# Patient Record
(demographics unavailable — no encounter records)

---

## 2024-10-07 NOTE — PHYSICAL EXAM
[TextEntry] : Constitutional: Well  appearing, not in acute distress Head: Normocephalic, no lesions Eyes: PERRLA, conjunctiva is NL, clear Ear: Ear canal is normal, tympanic membrane is intact Nose: Nasal turbinates are NL Throat: Clear, no exudates, no lesions Neck: Supple, no masses Chest: Lungs are clear, no rales, no rhonchi, no wheezing Heart: Regular rate, no murmurs Breast: b/l breast is symmetric, areola and nipple is NL, breast is multinodular, dense. Axillary LN is NL b/l Abdomen: Soft, no tenderness, no masses, bowel sounds are normal : No CVAT Extremities: FROM, no deformities, no edema Musculoskeletal: has no tenderness of the spine, ROM is NL Skin: No rashes, no lesions noted Neuro: AAO x 3, no focal neurological deficit. Psychiatric: oriented to person, place, and time and insight and judgment were intact.

## 2024-10-07 NOTE — HISTORY OF PRESENT ILLNESS
[FreeTextEntry1] : Annual visit. [de-identified] : Ms. FRANCHESKA CARROLL 46 year old female with a PMH HTN, hyperlipidemia, SADIA on a CPAP present to the office for a physical exam. Patient feels good, c/o joint pain  (L knee, L elbow, bilateral wrists, R pinky) on/off for past few months.  Following up with ENT, planning for tonsillectomy/adenoidectomy.  Hasn't had menses since 07/2024, appt with GYN on 10/16/2024.  Patient was seen by a cardiologist, had a ct scan of the coronary arteries on 07/17/24 - Calcium score 0 Seen by a pulmonologist did a sleep study dz with a sleep apnea

## 2024-10-07 NOTE — PLAN
[FreeTextEntry1] : Ms. FRANCHESKA CARROLL 46 year female with a PMH HTN, hyperlipidemia present to the office for a physical exam.  Well adult exam is performed. Result of the blood test discussed with the patient in detail from 09/28/24 Has mild Hyperlipidemia, LDL is 103, recommend low cholesterol diet ALT - 33(nl 0-32) CBC, CMP, TSH is NL HTN is well controlled, continue norvasc, metoprolol Do mammo, breast u/s F/u with a GYN F/u with GI(screening colonoscopy) For a joint pain do a blood test, f/u rheumatologist Anxiety - STEPH 7 score 5 - mild anxiety start zoloft 25mg daily Refused to get a flu vaccine  RTC to f/u in 3-4 mo.  Patient is verbalized understanding

## 2024-10-07 NOTE — PAST MEDICAL HISTORY
[Total Preg ___] : G[unfilled] [Live Births ___] : P[unfilled]  [Perimenopausal] : perimenopausal [Definite ___ (Date)] : the last menstrual period was [unfilled] [Normal Amount/Duration] : it was of a normal amount and duration [Amenorrhea] : amenorrhea

## 2024-10-07 NOTE — HEALTH RISK ASSESSMENT
[Good] : ~his/her~  mood as  good [Yes] : Yes [2 - 4 times a month (2 pts)] : 2-4 times a month (2 points) [Never (0 pts)] : Never (0 points) [No] : In the past 12 months have you used drugs other than those required for medical reasons? No [No falls in past year] : Patient reported no falls in the past year [0] : 2) Feeling down, depressed, or hopeless: Not at all (0) [PHQ-2 Negative - No further assessment needed] : PHQ-2 Negative - No further assessment needed [Current] : Current [0-4] : 0-4 [Patient reported mammogram was normal] : Patient reported mammogram was normal [Patient reported PAP Smear was normal] : Patient reported PAP Smear was normal [Learning/Retaining New Information] : difficulty learning/retaining new information [None] : None [With Family] : lives with family [# of Members in Household ___] :  household currently consist of [unfilled] member(s) [Employed] : employed [College] : College [] :  [# Of Children ___] : has [unfilled] children [Sexually Active] : sexually active [Feels Safe at Home] : Feels safe at home [Fully functional (bathing, dressing, toileting, transferring, walking, feeding)] : Fully functional (bathing, dressing, toileting, transferring, walking, feeding) [Fully functional (using the telephone, shopping, preparing meals, housekeeping, doing laundry, using] : Fully functional and needs no help or supervision to perform IADLs (using the telephone, shopping, preparing meals, housekeeping, doing laundry, using transportation, managing medications and managing finances) [Reports changes in vision] : Reports changes in vision [Smoke Detector] : smoke detector [Carbon Monoxide Detector] : carbon monoxide detector [Safety elements used in home] : safety elements used in home [Seat Belt] :  uses seat belt [Sunscreen] : uses sunscreen [Fair] : ~his/her~ current health as fair  [Intercurrent ED visits] : went to ED [5 or 6 (2 pts)] : 5 or 6 (2  points) [HIV Test offered] : HIV Test offered [Hepatitis C test offered] : Hepatitis C test offered [With Patient/Caregiver] : , with patient/caregiver [Name: ___] : Health Care Proxy's Name: [unfilled]  [Aggressive treatment] : aggressive treatment [I will adhere to the patient's wishes.] : I will adhere to the patient's wishes. [Time Spent: ___ minutes] : Time Spent: [unfilled] minutes [Reviewed no changes] : Reviewed, no changes [Relationship: ___] : Relationship: [unfilled] [NO] : No [de-identified] : May for palpitations [de-identified] : cardiologist, gynecologist, pulmonologist [Audit-CScore] : 4 [de-identified] : no [de-identified] : regular [[id number]] : 0 [Change in mental status noted] : No change in mental status noted [Language] : denies difficulty with language [Behavior] : denies difficulty with behavior [Handling Complex Tasks] : denies difficulty handling complex tasks [High Risk Behavior] : no high risk behavior [Reports changes in hearing] : Reports no changes in hearing [Reports changes in dental health] : Reports no changes in dental health [Travel to Developing Areas] : does not  travel to developing areas [TB Exposure] : is not being exposed to tuberculosis [MammogramDate] : 2023 [PapSmearDate] : 10/2023 [ColonoscopyDate] : 12/08/2019 [ColonoscopyComments] : IH, mild diverticulosis, f/u in 5 years [de-identified] :  and son [FreeTextEntry2] : works as a  at school [de-identified] : needs glasses for a far distance [AdvancecareDate] : 10/7/2024 [FreeTextEntry4] : Sedrick Toussaint  [phone]

## 2024-10-07 NOTE — COUNSELING
[Fall prevention counseling provided] : Fall prevention counseling provided [Adequate lighting] : Adequate lighting [No throw rugs] : No throw rugs [Use proper foot wear] : Use proper foot wear [Yes] : Risk of tobacco use and health benefits of smoking cessation discussed: Yes [Encouraged to pick a quit date and identify support needed to quit] : Encouraged to pick a quit date and identify support needed to quit [AUDIT-C Screening administered and reviewed] : AUDIT-C Screening administered and reviewed

## 2024-10-07 NOTE — REVIEW OF SYSTEMS
[TextEntry] : Constitutional: Denies fever, fatigue, recent changes in the weight Head: Denies headache, dizziness Eyes: Denies diplopia, tearing or pain Ears: Denies earache, tinnitus, hearing loss Nose: c/o  nasal congestion, Denies epistaxis Throat: Denies throat pain Neck: Denies stiffness, muscle tenderness Chest: Denies cough, SOB, wheezing, chest congestion CV: Denies chest pain, palpitation GI: Denies abdominal pain, constipation, heartburn Genitourinary: Denies dysuria, urinary urgency Musculoskeletal: c/o joint pain on/off Neuro: Denies changes in mental status Psychiatric: c/o anxiety on/off, Denies depressive symptoms, change in sleep habits, changes in thought content

## 2024-10-22 NOTE — PROCEDURE
[FreeTextEntry1] : Maskfitting Patient is currently using a ResMed F30i mask, size Wide.  She did not need to wear a size wide but she said she was wearing it because she thought it was medium.  She is pulling her mask off while asleep and dislikes the tubing wrapping up into her hair. Patient was fit with a ResMed N30i nasal mask, size small with a chin strap.  FRANCHESKA CARROLL was comfortable with the fit. Patient will try this mask and follow up with us within 2 weeks.

## 2024-12-05 NOTE — HISTORY OF PRESENT ILLNESS
[FreeTextEntry1] : Family h/o colon cancer last had procedure 5 years ago.  also had EGD 3x in past last one 2019 had GERD  recently started on BP meds and is controlled- sees cardiologist regularly.  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

## 2024-12-05 NOTE — PHYSICAL EXAM
[Alert] : alert [Normal Voice/Communication] : normal voice/communication [Healthy Appearing] : healthy appearing [No Acute Distress] : no acute distress [Sclera] : the sclera and conjunctiva were normal [Hearing Threshold Finger Rub Not [place]] : hearing was normal [Normal Lips/Gums] : the lips and gums were normal [Oropharynx] : the oropharynx was normal [Normal Appearance] : the appearance of the neck was normal [No Neck Mass] : no neck mass was observed [No Respiratory Distress] : no respiratory distress [No Acc Muscle Use] : no accessory muscle use [Respiration, Rhythm And Depth] : normal respiratory rhythm and effort [Auscultation Breath Sounds / Voice Sounds] : lungs were clear to auscultation bilaterally [Heart Rate And Rhythm] : heart rate was normal and rhythm regular [Normal S1, S2] : normal S1 and S2 [Murmurs] : no murmurs [Bowel Sounds] : normal bowel sounds [Abdomen Tenderness] : non-tender [No Masses] : no abdominal mass palpated [Abdomen Soft] : soft [] : no hepatosplenomegaly [Oriented To Time, Place, And Person] : oriented to person, place, and time

## 2024-12-26 NOTE — ASSESSMENT
[FreeTextEntry1] : 47 year old female presents with polyarticular joint pains, which appear to be multifactorial: 1)  Pain in hands, shelly B/L 5th DIP's:  most c/w OA   - Ordered x-rays of hands.   - Recommended hand exercises   - Rx naproxen 500mg BID prn.  Pt can also try Tylenol prn   - heating pads or ice packs   - OTC topical analgesics prn - suggested Voltaren gel 2)  B/L elbow pain:  most c/w lateral epicondylitis   - Minimize activities placing stress on the elbow   - Naproxen prn as above   - ice packs   - tennis elbow band   - Elbow exercises 3)  B/L "hip" pain:  most c/w trochanteric bursitis    - Recommended exercises   - Naproxen as above   - Ice packs prn   - If no improvement by next visit, pt to consider corticosteroid injections.

## 2024-12-26 NOTE — HISTORY OF PRESENT ILLNESS
[FreeTextEntry1] : 47 year old female with PMHx as listed below reports that she has been experiencing joint pains, including her B/L 5th DIP's, elbows, and hips, for the past year. B/L hip pain:  occurs primarily when lying on her sides.  Described as dull, 6-7 out of 10.  No swelling or erythema B/L 5th DIP's and B/L elbows:   The pain occurs primarily w/ activity.  She describes the pain as sharp, 6-7 out of 10.  She denies any joint swelling or erythema.  (+)AM stiffness, usually lasting for several minutes.  She gets some relief from ibuprofen or turmeric.  No other known alleviating factors. No F/C, no unintentional weight loss, no night sweats, no oral ulcers, no rashes, no alopecia, no photosensitivity, no dry eyes/dry mouth, no Raynaud symptoms, no focal weakness, no dysphagia  [Anorexia] : no anorexia [Weight Loss] : no weight loss [Malaise] : no malaise [Fever] : no fever [Chills] : no chills [Fatigue] : no fatigue [Malar Facial Rash] : no malar facial rash [Skin Lesions] : no lesions [Skin Nodules] : no skin nodules [Oral Ulcers] : no oral ulcers [Cough] : no cough [Dry Mouth] : no dry mouth [Dysphonia] : no dysphonia [Dysphagia] : no dysphagia [Shortness of Breath] : no shortness of breath [Chest Pain] : no chest pain

## 2024-12-26 NOTE — PHYSICAL EXAM
[General Appearance - Alert] : alert [General Appearance - In No Acute Distress] : in no acute distress [Sclera] : the sclera and conjunctiva were normal [Outer Ear] : the ears and nose were normal in appearance [Oropharynx] : the oropharynx was normal [Neck Appearance] : the appearance of the neck was normal [Neck Cervical Mass (___cm)] : no neck mass was observed [Jugular Venous Distention Increased] : there was no jugular-venous distention [Thyroid Diffuse Enlargement] : the thyroid was not enlarged [Thyroid Nodule] : there were no palpable thyroid nodules [Auscultation Breath Sounds / Voice Sounds] : lungs were clear to auscultation bilaterally [Heart Rate And Rhythm] : heart rate was normal and rhythm regular [Heart Sounds] : normal S1 and S2 [Heart Sounds Gallop] : no gallops [Murmurs] : no murmurs [Heart Sounds Pericardial Friction Rub] : no pericardial rub [Edema] : there was no peripheral edema [Bowel Sounds] : normal bowel sounds [Abdomen Soft] : soft [Abdomen Tenderness] : non-tender [Abdomen Mass (___ Cm)] : no abdominal mass palpated [Cervical Lymph Nodes Enlarged Posterior Bilaterally] : posterior cervical [Cervical Lymph Nodes Enlarged Anterior Bilaterally] : anterior cervical [Skin Color & Pigmentation] : normal skin color and pigmentation [Supraclavicular Lymph Nodes Enlarged Bilaterally] : supraclavicular [Skin Turgor] : normal skin turgor [No Focal Deficits] : no focal deficits [] : no rash [Oriented To Time, Place, And Person] : oriented to person, place, and time [Impaired Insight] : insight and judgment were intact [Affect] : the affect was normal [FreeTextEntry1] : No synovitis;  (+)B/L Heberden's nodes;  (+mild tenderness in B/L 5th DIP's;  B/L elbows w/ (+)tenderness over lateral epicondyles;  (+)tenderness over B/L trochanteric bursae;  normal ROM in all joints

## 2024-12-26 NOTE — CONSULT LETTER
[Dear  ___] : Dear  [unfilled], [Consult Letter:] : I had the pleasure of evaluating your patient, [unfilled]. [Please see my note below.] : Please see my note below. [Consult Closing:] : Thank you very much for allowing me to participate in the care of this patient.  If you have any questions, please do not hesitate to contact me. [Sincerely,] : Sincerely, [FreeTextEntry3] : Ayan Whitten MD Rheumatology [facility]  of Medicine [place] [facility] at [address] [place] [address]  phone:  [phone] fax:      [phone]

## 2024-12-26 NOTE — PHYSICAL EXAM
[General Appearance - Alert] : alert [General Appearance - In No Acute Distress] : in no acute distress [Sclera] : the sclera and conjunctiva were normal [Outer Ear] : the ears and nose were normal in appearance [Oropharynx] : the oropharynx was normal [Neck Appearance] : the appearance of the neck was normal [Neck Cervical Mass (___cm)] : no neck mass was observed [Jugular Venous Distention Increased] : there was no jugular-venous distention [Thyroid Diffuse Enlargement] : the thyroid was not enlarged [Thyroid Nodule] : there were no palpable thyroid nodules [Auscultation Breath Sounds / Voice Sounds] : lungs were clear to auscultation bilaterally [Heart Rate And Rhythm] : heart rate was normal and rhythm regular [Heart Sounds] : normal S1 and S2 [Heart Sounds Gallop] : no gallops [Murmurs] : no murmurs [Heart Sounds Pericardial Friction Rub] : no pericardial rub [Edema] : there was no peripheral edema [Bowel Sounds] : normal bowel sounds [Abdomen Soft] : soft [Abdomen Tenderness] : non-tender [Abdomen Mass (___ Cm)] : no abdominal mass palpated [Cervical Lymph Nodes Enlarged Posterior Bilaterally] : posterior cervical [Cervical Lymph Nodes Enlarged Anterior Bilaterally] : anterior cervical [Supraclavicular Lymph Nodes Enlarged Bilaterally] : supraclavicular [Skin Color & Pigmentation] : normal skin color and pigmentation [Skin Turgor] : normal skin turgor [No Focal Deficits] : no focal deficits [] : no rash [Oriented To Time, Place, And Person] : oriented to person, place, and time [Impaired Insight] : insight and judgment were intact [Affect] : the affect was normal [FreeTextEntry1] : No synovitis;  (+)B/L Heberden's nodes;  (+mild tenderness in B/L 5th DIP's;  B/L elbows w/ (+)tenderness over lateral epicondyles;  (+)tenderness over B/L trochanteric bursae;  normal ROM in all joints

## 2024-12-26 NOTE — CONSULT LETTER
[Dear  ___] : Dear  [unfilled], [Consult Letter:] : I had the pleasure of evaluating your patient, [unfilled]. [Please see my note below.] : Please see my note below. [Consult Closing:] : Thank you very much for allowing me to participate in the care of this patient.  If you have any questions, please do not hesitate to contact me. [Sincerely,] : Sincerely, [FreeTextEntry3] : Ayan Whitten MD Rheumatology [facility]  of Medicine [place] [facility] at [address] [place] [address]  phone:  [phone] fax:      [phone]

## 2025-01-02 NOTE — DISCUSSION/SUMMARY
[FreeTextEntry1] : 47 year old female with PMH of HTN ( 1 yr ) and HLD presents in for follow up. Denies chest pain, shortness of breath, dizziness, lightheadedness, palpitations or near syncope or syncope, orthopnea, PND and increasing lower extremity edema. She recently returned from [place]. Has been so stressed that she quir her job as an .   # HTN : BP Stable  Metoprolol XL 50 mg and Amlodipine 5 mg  Encouraged Patient to monitor BP at home and keep a log and report results back to us for evaluation. Based on the results, we will adjust medications as necessary.  Additionally, encouraged heart-healthy diet and exercise as tolerated. Shaila with the recent weight gain with the holidays EKG with no acute changes.  #SADIA diagnosed with sleep apnea but is not always compliant with the mask encouraged to be more compliant since it can affect everything including her BP, weight, fatigue, etc  7/30/22: HLD: , , HDL 51,  Encouraged patient to continue healthy exercise and eating habits, focusing on a Mediterranean style of eating and aiming for the recommended 150 minutes per week of moderate physical activity.  Repeat blood work : Lipid panel  and A1c [EKG obtained to assist in diagnosis and management of assessed problem(s)] : EKG obtained to assist in diagnosis and management of assessed problem(s)

## 2025-01-02 NOTE — HISTORY OF PRESENT ILLNESS
[FreeTextEntry1] : 47 year old female with PMH of HTN ( 1 yr ) and HLD presents in for follow up. Denies chest pain, shortness of breath, dizziness, lightheadedness, palpitations or near syncope or syncope, orthopnea, PND and increasing lower extremity edema. She recently returned from [place]. Has been so stressed that she quir her job as an .   # HTN : BP Stable  Metoprolol XL 50 mg and Amlodipine 5 mg  Encouraged Patient to monitor BP at home and keep a log and report results back to us for evaluation. Based on the results, we will adjust medications as necessary.  Additionally, encouraged heart-healthy diet and exercise as tolerated. Shaila with the recent weight gain with the holidays EKG with no acute changes.  #SADIA diagnosed with sleep apnea but is not always compliant with the mask encouraged to be more compliant since it can affect everything including her BP, weight, fatigue, etc  7/30/22: HLD: , , HDL 51,  Encouraged patient to continue healthy exercise and eating habits, focusing on a Mediterranean style of eating and aiming for the recommended 150 minutes per week of moderate physical activity.  Repeat blood work : Lipid panel  and A1c
[FreeTextEntry1] : 47 year old female with PMH of HTN ( 1 yr ) and HLD presents in for follow up. Denies chest pain, shortness of breath, dizziness, lightheadedness, palpitations or near syncope or syncope, orthopnea, PND and increasing lower extremity edema. She recently returned from [place]. Has been so stressed that she quir her job as an .   # HTN : BP Stable  Metoprolol XL 50 mg and Amlodipine 5 mg  Encouraged Patient to monitor BP at home and keep a log and report results back to us for evaluation. Based on the results, we will adjust medications as necessary.  Additionally, encouraged heart-healthy diet and exercise as tolerated. Shaila with the recent weight gain with the holidays EKG with no acute changes.  #SADIA diagnosed with sleep apnea but is not always compliant with the mask encouraged to be more compliant since it can affect everything including her BP, weight, fatigue, etc  7/30/22: HLD: , , HDL 51,  Encouraged patient to continue healthy exercise and eating habits, focusing on a Mediterranean style of eating and aiming for the recommended 150 minutes per week of moderate physical activity.  Repeat blood work : Lipid panel  and A1c
SOB

## 2025-07-11 NOTE — HISTORY OF PRESENT ILLNESS
[FreeTextEntry1] : Ms. Toussaint is a 47 year old F seen today for 6 month follow up , intially seen 2021 by Dr. Mraio for complaints of palpitations. Past health history remarkable for HTN, HPL, SADIA ( non compliant), obesity ( recent weight loss 7 lb).  Recently changed employment due to stress and has improved, but still experiences anxiety easily.  Plans to travel to see family in [place] x 1 month and finds this anxiety provoking.  Admits to poor sleep, recent menopause/ perimenapaue with night sweats and insomnia, plans to make appt with GYN.  Denies chest pain, shortness of breath, dizziness, lightheadedness, palpitations or near syncope or syncope, orthopnea, PND and increasing lower extremity edema.  BP today: 127/86,  EKG: NSR, normal EKG Hydrates well, maintains HH diet, but does admit to NA indiscretion at times Has increased exercise and loss of 7 lb, would like to lose 20#, discussed Ozempic but will continue with lifestyle management over the summer + Anxiety: referred back to Dr. Roy as well  Diagnostics:  6/2024: TTE: Normal LV function, no valvular heart disease  6/2024: Exercise stress test: no ischemia, rate PVC, DTS: 5.0, low  CV risk 2021: Holter monitor: no sig arrythmias  PMD monitors labwork: last 9/2024 sees annually, to send annual labwork prior to next appt in Fall 2025.  9/2024:  LIpid profile:  chol 175, HDL 51     # HTN : BP Stable  Metoprolol XL 50 mg and Amlodipine 5 mg  Encouraged Patient to monitor BP at home and keep a log and report results back to us for evaluation. Based on the results, we will adjust medications as necessary.  Additionally, encouraged heart-healthy diet and exercise as tolerated. Shaila with the recent weight gain with the holidays EKG with no acute changes.  #SADIA diagnosed with sleep apnea but is not always compliant with the mask encouraged to be more compliant since it can affect everything including her BP, weight, fatigue, etc  7/30/22: HLD: , , HDL 51,  Encouraged patient to continue healthy exercise and eating habits, focusing on a Mediterranean style of eating and aiming for the recommended 150 minutes per week of moderate physical activity.  Repeat blood work : Lipid panel  and A1c

## 2025-07-11 NOTE — DISCUSSION/SUMMARY
[FreeTextEntry1] : In summary, Ms. Toussaint is a 47 year old F seen today for 6 month follow up , intially seen 2021 by Dr. Mario for complaints of palpitations. Past health history remarkable for HTN, HPL, SADIA ( non compliant), obesity ( recent weight loss 7 lb).  Recently changed employment due to stress and has improved, but still experiences anxiety easily.  Plans to travel to see family in [place] x 1 month and finds this anxiety provoking.  Admits to poor sleep, recent menopause/ perimenapaue with night sweats and insomnia, plans to make appt with GYN.  Denies chest pain, shortness of breath, dizziness, lightheadedness, palpitations or near syncope or syncope, orthopnea, PND and increasing lower extremity edema.  BP today: 127/86,  EKG: NSR, normal EKG Hydrates well, maintains HH diet, but does admit to NA indiscretion at times Has increased exercise and loss of 7 lb, would like to lose 20#, discussed Ozempic but will continue with lifestyle management over the summer + Anxiety: referred back to Dr. Roy as well  Diagnostics:  6/2024: TTE: Normal LV function, no valvular heart disease  6/2024: Exercise stress test: no ischemia, rate PVC, DTS: 5.0, low  CV risk 2021: Holter monitor: no sig arrythmias  PMD monitors labwork: last 9/2024 sees annually, to send annual labwork prior to next appt in Fall 2025.  9/2024:  LIpid profile:  chol 175, HDL 51     # HTN : BP Stable  Continue Metoprolol XL 50 mg and Amlodipine 5 mg  Encouraged Patient to monitor BP at home and keep a log and report results back to us for evaluation. Based on the results, we will adjust medications as necessary.  Additionally, encouraged heart-healthy diet and exercise as tolerated. Shaila with the recent weight gain with the holidays EKG with no acute changes.  #SADIA diagnosed with sleep apnea but is not always compliant with the mask encouraged to be more compliant since it can affect everything including her BP, weight, fatigue, etc  #Hyperlipidemia hx: (mild)7/30/22: HLD: , , HDL 51,  Encouraged patient to continue healthy exercise and eating habits, focusing on a Mediterranean style of eating and aiming for the recommended 150 minutes per week of moderate physical activity.  Repeat blood work : Lipid panel  and A1c: 6 mo with PMD and to send  # Anxiety -discussed stress reduction -referral back to Dr. Roy  [EKG obtained to assist in diagnosis and management of assessed problem(s)] : EKG obtained to assist in diagnosis and management of assessed problem(s)